# Patient Record
Sex: FEMALE
[De-identification: names, ages, dates, MRNs, and addresses within clinical notes are randomized per-mention and may not be internally consistent; named-entity substitution may affect disease eponyms.]

---

## 2023-07-12 PROBLEM — Z00.00 ENCOUNTER FOR PREVENTIVE HEALTH EXAMINATION: Status: ACTIVE | Noted: 2023-07-12

## 2023-08-17 PROBLEM — N60.09 BREAST CYST: Status: ACTIVE | Noted: 2023-08-17

## 2023-08-17 NOTE — PHYSICAL EXAM
[Supple] : supple [No Supraclavicular Adenopathy] : no supraclavicular adenopathy [Examined in the supine and seated position] : examined in the supine and seated position [No Nipple Retraction] : no left nipple retraction [No Nipple Discharge] : no left nipple discharge [No Axillary Lymphadenopathy] : no left axillary lymphadenopathy

## 2023-08-23 ENCOUNTER — RESULT REVIEW (OUTPATIENT)
Age: 33
End: 2023-08-23

## 2023-08-23 ENCOUNTER — APPOINTMENT (OUTPATIENT)
Dept: BREAST CENTER | Facility: CLINIC | Age: 33
End: 2023-08-23
Payer: MEDICAID

## 2023-08-23 ENCOUNTER — NON-APPOINTMENT (OUTPATIENT)
Age: 33
End: 2023-08-23

## 2023-08-23 VITALS
WEIGHT: 125 LBS | BODY MASS INDEX: 20.83 KG/M2 | SYSTOLIC BLOOD PRESSURE: 99 MMHG | DIASTOLIC BLOOD PRESSURE: 69 MMHG | HEART RATE: 64 BPM | HEIGHT: 65 IN

## 2023-08-23 DIAGNOSIS — N60.09 SOLITARY CYST OF UNSPECIFIED BREAST: ICD-10-CM

## 2023-08-23 PROCEDURE — 19000 PUNCTURE ASPIR CYST BREAST: CPT

## 2023-08-23 PROCEDURE — 88305 TISSUE EXAM BY PATHOLOGIST: CPT | Mod: 26

## 2023-08-23 PROCEDURE — 76942 ECHO GUIDE FOR BIOPSY: CPT | Mod: 59

## 2023-08-23 PROCEDURE — 99204 OFFICE O/P NEW MOD 45 MIN: CPT | Mod: 25

## 2023-08-23 PROCEDURE — 88173 CYTOPATH EVAL FNA REPORT: CPT | Mod: 26

## 2023-08-23 PROCEDURE — 76642 ULTRASOUND BREAST LIMITED: CPT

## 2023-08-24 ENCOUNTER — OUTPATIENT (OUTPATIENT)
Dept: OUTPATIENT SERVICES | Facility: HOSPITAL | Age: 33
LOS: 1 days | End: 2023-08-24
Payer: COMMERCIAL

## 2023-08-24 DIAGNOSIS — N60.01 SOLITARY CYST OF RIGHT BREAST: ICD-10-CM

## 2023-08-24 PROCEDURE — 88305 TISSUE EXAM BY PATHOLOGIST: CPT

## 2023-08-24 PROCEDURE — 88173 CYTOPATH EVAL FNA REPORT: CPT

## 2023-08-25 LAB
NON-GYNECOLOGICAL CYTOLOGY STUDY: SIGNIFICANT CHANGE UP

## 2023-08-25 NOTE — DATA REVIEWED
[FreeTextEntry1] : 7/11/23 ([facility]) B/L DX MMG/US: extremely dense.  -The patient indicates her palpable abnormality to be at left 4:00 5cmfn. This correlates to a 1.9 x 1.3 x 1.9 cm benign-appearing cyst. -Multiple other benign-appearing cysts are seen bilaterally, representative cysts are as follows: -Right breast: 12:00 N1, 2.2 x 1.2 x 2.4 cm; 1:00 N2, 3.6 x 1.2 x 3.3 cm (this was reevaluated due to apparent flow anteriorly, seen to be due to a septation); 2:00 N1, 2.5 x 1.7 x 3.1 cm; 4:00 N2, 1.1 x 0.7 x 0.9 cm; 6:00 N1, 4.1 x 1.1 x 4.1 cm; 9:00 N6, 4.4 x 1.3 x 4.7 cm; -Left breast: 12:00 N2, 7 x 6 x 7 mm; 1:00 N2, 9 x 8 x 9 mm; 6:00 N3, 2.6 x 0.7 x 2.9 cm. -No suspicious sonographic lesion is defined within the breasts bilaterally. No abnormal lymphadenopathy is demonstrated in the axilla bilaterally. IMPRESSION:  1. No mammographic nor US evidence of malignancy.   2. Bilateral benign-appearing breast cysts, right greater than left. 3. The patient's palpable abnormality left lower outer breast correlates to a benign cyst at left 4:00 6cmfn FOLLOW-UP: Clinical correlation and assessment. BI-RADS 2:  Benign.

## 2023-08-25 NOTE — HISTORY OF PRESENT ILLNESS
[FreeTextEntry1] : 32 year old female was referred by Dr. Bang who presents for initial evaluation regarding right breast cyst since 2019, bothersome to patient. Denies prior breast biopsies. Patient denies nipple discharge, skin changes.  B/L DX MMG/US 7/11/23 BIRADS-2 revealed multiple benign-appearing cysts bilaterally, R greater than L; makes note of a 1.9cm benign appearing cyst at L 4:00 5cmfn corresponding to patient's noted palpable abnormality, patient states she no longer feels this.   Patient reports surgical excision of R breast cysts x2 in the [place] at age 14.   Patient denies family history of breast cancer or ovarian cancer. [facility]az[facility] Lifetime Risk 13.7%

## 2023-08-25 NOTE — PROCEDURE
[FreeTextEntry1] : R US-guided cyst aspiration x2 [FreeTextEntry2] : R breast cysts  [FreeTextEntry3] : Technique: a targeted right breast ultrasound was performed with evaluation of the LIQ and subareolar region. US Findings:  -right breast cyst in the subareolar region was detected 1.7cm x 2.3cm transverse by 1.8cm x 2.2cm longitudinal in dimension -right breast cyst at 4:00 3cmFN was detected 1.8cm x 3.0cm transverse by 1.9cm x 3.0cm longitudinal in dimension No suspicious solid or complex cystic masses were detected  Technique: patient was prepped and draped, cleansed with alcohol, ultrasound gel applied, intended site of aspiration was located, using a 25G needle, a total of 5cc of 1% lidocaine was injected for local anesthetic, using ultrasound guidance, an 18G needle was introduced into site of cyst with a total of 14cc of tanfluid aspirated Sent aspirate for cytology  The patient tolerated the procedure well, no complications.

## 2023-08-25 NOTE — CONSULT LETTER
[Dear  ___] : Dear ~DENA, [Consult Letter:] : I had the pleasure of evaluating your patient, [unfilled]. [Please see my note below.] : Please see my note below. [Consult Closing:] : Thank you very much for allowing me to participate in the care of this patient.  If you have any questions, please do not hesitate to contact me. [Sincerely,] : Sincerely, [FreeTextEntry2] : Dr. Bang [FreeTextEntry3] : Ayad Cazares MD Chief of Breast Surgery Director of Breast Cancer Program [facility]

## 2023-08-25 NOTE — PAST MEDICAL HISTORY
[Menstruating] : The patient is menstruating [Menarche Age ____] : age at menarche was [unfilled] [Approximately ___] : the LMP was approximately [unfilled] [Total Preg ___] : G[unfilled] [Live Births ___] : P[unfilled]  [History of Hormone Replacement Treatment] : has no history of hormone replacement treatment [FreeTextEntry6] : NO [FreeTextEntry7] : YES [FreeTextEntry8] : N/A

## 2024-07-15 PROBLEM — R92.8 ABNORMAL FINDING ON BREAST IMAGING: Status: ACTIVE | Noted: 2024-07-15

## 2024-07-17 ENCOUNTER — APPOINTMENT (OUTPATIENT)
Dept: BREAST CENTER | Facility: CLINIC | Age: 34
End: 2024-07-17
Payer: COMMERCIAL

## 2024-07-17 VITALS
WEIGHT: 139 LBS | SYSTOLIC BLOOD PRESSURE: 102 MMHG | BODY MASS INDEX: 23.16 KG/M2 | HEIGHT: 65 IN | HEART RATE: 88 BPM | DIASTOLIC BLOOD PRESSURE: 71 MMHG

## 2024-07-17 VITALS — WEIGHT: 139 LBS | HEIGHT: 65 IN | BODY MASS INDEX: 23.16 KG/M2

## 2024-07-17 DIAGNOSIS — N60.09 SOLITARY CYST OF UNSPECIFIED BREAST: ICD-10-CM

## 2024-07-17 DIAGNOSIS — R92.8 OTHER ABNORMAL AND INCONCLUSIVE FINDINGS ON DIAGNOSTIC IMAGING OF BREAST: ICD-10-CM

## 2024-07-17 PROCEDURE — 99213 OFFICE O/P EST LOW 20 MIN: CPT

## 2025-02-27 ENCOUNTER — NON-APPOINTMENT (OUTPATIENT)
Age: 35
End: 2025-02-27

## 2025-02-27 ENCOUNTER — APPOINTMENT (OUTPATIENT)
Dept: BREAST CENTER | Facility: CLINIC | Age: 35
End: 2025-02-27
Payer: COMMERCIAL

## 2025-02-27 ENCOUNTER — APPOINTMENT (OUTPATIENT)
Dept: BREAST CENTER | Facility: CLINIC | Age: 35
End: 2025-02-27

## 2025-02-27 VITALS
HEIGHT: 65 IN | SYSTOLIC BLOOD PRESSURE: 106 MMHG | WEIGHT: 137 LBS | DIASTOLIC BLOOD PRESSURE: 70 MMHG | BODY MASS INDEX: 22.82 KG/M2 | HEART RATE: 103 BPM

## 2025-02-27 DIAGNOSIS — N63.0 UNSPECIFIED LUMP IN UNSPECIFIED BREAST: ICD-10-CM

## 2025-02-27 DIAGNOSIS — N60.02 SOLITARY CYST OF RIGHT BREAST: ICD-10-CM

## 2025-02-27 DIAGNOSIS — N60.01 SOLITARY CYST OF RIGHT BREAST: ICD-10-CM

## 2025-02-27 PROCEDURE — 99213 OFFICE O/P EST LOW 20 MIN: CPT

## 2025-02-27 RX ORDER — UBIDECARENONE/VIT E ACET 100MG-5
CAPSULE ORAL
Refills: 0 | Status: ACTIVE | COMMUNITY

## 2025-03-24 ENCOUNTER — NON-APPOINTMENT (OUTPATIENT)
Age: 35
End: 2025-03-24